# Patient Record
Sex: FEMALE | Race: WHITE | NOT HISPANIC OR LATINO | Employment: FULL TIME | URBAN - METROPOLITAN AREA
[De-identification: names, ages, dates, MRNs, and addresses within clinical notes are randomized per-mention and may not be internally consistent; named-entity substitution may affect disease eponyms.]

---

## 2023-10-09 DIAGNOSIS — F90.0 ATTENTION DEFICIT HYPERACTIVITY DISORDER (ADHD), PREDOMINANTLY INATTENTIVE TYPE: Primary | ICD-10-CM

## 2023-10-09 RX ORDER — DEXTROAMPHETAMINE SACCHARATE, AMPHETAMINE ASPARTATE MONOHYDRATE, DEXTROAMPHETAMINE SULFATE AND AMPHETAMINE SULFATE 7.5; 7.5; 7.5; 7.5 MG/1; MG/1; MG/1; MG/1
30 CAPSULE, EXTENDED RELEASE ORAL EVERY MORNING
COMMUNITY
Start: 2015-02-06 | End: 2023-10-10 | Stop reason: SDUPTHER

## 2023-10-10 RX ORDER — DEXTROAMPHETAMINE SACCHARATE, AMPHETAMINE ASPARTATE MONOHYDRATE, DEXTROAMPHETAMINE SULFATE AND AMPHETAMINE SULFATE 7.5; 7.5; 7.5; 7.5 MG/1; MG/1; MG/1; MG/1
30 CAPSULE, EXTENDED RELEASE ORAL EVERY MORNING
Qty: 30 CAPSULE | Refills: 0 | Status: SHIPPED | OUTPATIENT
Start: 2023-10-10 | End: 2023-11-07 | Stop reason: SDUPTHER

## 2023-10-10 RX ORDER — DEXTROAMPHETAMINE SACCHARATE, AMPHETAMINE ASPARTATE MONOHYDRATE, DEXTROAMPHETAMINE SULFATE AND AMPHETAMINE SULFATE 7.5; 7.5; 7.5; 7.5 MG/1; MG/1; MG/1; MG/1
30 CAPSULE, EXTENDED RELEASE ORAL EVERY MORNING
Qty: 30 CAPSULE | Refills: 0 | Status: CANCELLED | OUTPATIENT
Start: 2023-10-10

## 2023-10-27 ENCOUNTER — LAB (OUTPATIENT)
Dept: LAB | Facility: LAB | Age: 43
End: 2023-10-27
Payer: COMMERCIAL

## 2023-10-27 DIAGNOSIS — Z00.01 ENCOUNTER FOR GENERAL ADULT MEDICAL EXAMINATION WITH ABNORMAL FINDINGS: Primary | ICD-10-CM

## 2023-10-27 LAB
25(OH)D3 SERPL-MCNC: 25 NG/ML (ref 30–100)
ALBUMIN SERPL BCP-MCNC: 4.3 G/DL (ref 3.4–5)
ALP SERPL-CCNC: 64 U/L (ref 33–110)
ALT SERPL W P-5'-P-CCNC: 10 U/L (ref 7–45)
ANION GAP SERPL CALC-SCNC: 8 MMOL/L (ref 10–20)
APPEARANCE UR: ABNORMAL
AST SERPL W P-5'-P-CCNC: 13 U/L (ref 9–39)
BASOPHILS # BLD AUTO: 0.04 X10*3/UL (ref 0–0.1)
BASOPHILS NFR BLD AUTO: 0.4 %
BILIRUB SERPL-MCNC: 0.7 MG/DL (ref 0–1.2)
BILIRUB UR STRIP.AUTO-MCNC: NEGATIVE MG/DL
BUN SERPL-MCNC: 10 MG/DL (ref 6–23)
CALCIUM SERPL-MCNC: 9.5 MG/DL (ref 8.6–10.3)
CHLORIDE SERPL-SCNC: 104 MMOL/L (ref 98–107)
CHOLEST SERPL-MCNC: 164 MG/DL (ref 0–199)
CHOLESTEROL/HDL RATIO: 2.4
CO2 SERPL-SCNC: 29 MMOL/L (ref 21–32)
COLOR UR: YELLOW
CREAT SERPL-MCNC: 0.81 MG/DL (ref 0.5–1.05)
EOSINOPHIL # BLD AUTO: 0 X10*3/UL (ref 0–0.7)
EOSINOPHIL NFR BLD AUTO: 0 %
ERYTHROCYTE [DISTWIDTH] IN BLOOD BY AUTOMATED COUNT: 13 % (ref 11.5–14.5)
GFR SERPL CREATININE-BSD FRML MDRD: >90 ML/MIN/1.73M*2
GLUCOSE SERPL-MCNC: 80 MG/DL (ref 74–99)
GLUCOSE UR STRIP.AUTO-MCNC: NEGATIVE MG/DL
HCT VFR BLD AUTO: 41.1 % (ref 36–46)
HDLC SERPL-MCNC: 67 MG/DL
HGB BLD-MCNC: 13.4 G/DL (ref 12–16)
IMM GRANULOCYTES # BLD AUTO: 0.05 X10*3/UL (ref 0–0.7)
IMM GRANULOCYTES NFR BLD AUTO: 0.5 % (ref 0–0.9)
KETONES UR STRIP.AUTO-MCNC: NEGATIVE MG/DL
LDLC SERPL CALC-MCNC: 82 MG/DL
LEUKOCYTE ESTERASE UR QL STRIP.AUTO: NEGATIVE
LYMPHOCYTES # BLD AUTO: 2.49 X10*3/UL (ref 1.2–4.8)
LYMPHOCYTES NFR BLD AUTO: 24.3 %
MCH RBC QN AUTO: 30 PG (ref 26–34)
MCHC RBC AUTO-ENTMCNC: 32.6 G/DL (ref 32–36)
MCV RBC AUTO: 92 FL (ref 80–100)
MONOCYTES # BLD AUTO: 0.53 X10*3/UL (ref 0.1–1)
MONOCYTES NFR BLD AUTO: 5.2 %
NEUTROPHILS # BLD AUTO: 7.14 X10*3/UL (ref 1.2–7.7)
NEUTROPHILS NFR BLD AUTO: 69.6 %
NITRITE UR QL STRIP.AUTO: NEGATIVE
NON HDL CHOLESTEROL: 97 MG/DL (ref 0–149)
NRBC BLD-RTO: 0 /100 WBCS (ref 0–0)
PH UR STRIP.AUTO: 7 [PH]
PLATELET # BLD AUTO: 275 X10*3/UL (ref 150–450)
PMV BLD AUTO: 10.7 FL (ref 7.5–11.5)
POTASSIUM SERPL-SCNC: 4 MMOL/L (ref 3.5–5.3)
PROT SERPL-MCNC: 6.4 G/DL (ref 6.4–8.2)
PROT UR STRIP.AUTO-MCNC: NEGATIVE MG/DL
RBC # BLD AUTO: 4.46 X10*6/UL (ref 4–5.2)
RBC # UR STRIP.AUTO: NEGATIVE /UL
SODIUM SERPL-SCNC: 137 MMOL/L (ref 136–145)
SP GR UR STRIP.AUTO: 1.02
TRIGL SERPL-MCNC: 77 MG/DL (ref 0–149)
TSH SERPL-ACNC: 1.11 MIU/L (ref 0.44–3.98)
UROBILINOGEN UR STRIP.AUTO-MCNC: <2 MG/DL
VLDL: 15 MG/DL (ref 0–40)
WBC # BLD AUTO: 10.3 X10*3/UL (ref 4.4–11.3)

## 2023-10-27 PROCEDURE — 36415 COLL VENOUS BLD VENIPUNCTURE: CPT

## 2023-10-27 PROCEDURE — 80061 LIPID PANEL: CPT

## 2023-10-27 PROCEDURE — 80053 COMPREHEN METABOLIC PANEL: CPT

## 2023-10-27 PROCEDURE — 81003 URINALYSIS AUTO W/O SCOPE: CPT

## 2023-10-27 PROCEDURE — 84443 ASSAY THYROID STIM HORMONE: CPT

## 2023-10-27 PROCEDURE — 83036 HEMOGLOBIN GLYCOSYLATED A1C: CPT

## 2023-10-27 PROCEDURE — 85025 COMPLETE CBC W/AUTO DIFF WBC: CPT

## 2023-10-27 PROCEDURE — 82306 VITAMIN D 25 HYDROXY: CPT

## 2023-10-28 LAB
EST. AVERAGE GLUCOSE BLD GHB EST-MCNC: 111 MG/DL
HBA1C MFR BLD: 5.5 %

## 2023-10-29 PROBLEM — B96.89 BACTERIAL VAGINOSIS: Status: ACTIVE | Noted: 2023-10-29

## 2023-10-29 PROBLEM — L98.9 SKIN LESION: Status: ACTIVE | Noted: 2023-10-29

## 2023-10-29 PROBLEM — F90.9 ADHD (ATTENTION DEFICIT HYPERACTIVITY DISORDER): Status: ACTIVE | Noted: 2023-10-29

## 2023-10-29 PROBLEM — R92.8 ABNORMAL MAMMOGRAM: Status: ACTIVE | Noted: 2023-10-29

## 2023-10-29 PROBLEM — N76.0 BACTERIAL VAGINOSIS: Status: ACTIVE | Noted: 2023-10-29

## 2023-10-29 PROBLEM — R17 ELEVATED BILIRUBIN: Status: ACTIVE | Noted: 2023-10-29

## 2023-10-29 PROBLEM — N39.0 UTI (URINARY TRACT INFECTION): Status: ACTIVE | Noted: 2023-10-29

## 2023-10-29 RX ORDER — FERROUS FUMARATE 324(106)MG
324 TABLET ORAL DAILY
COMMUNITY
Start: 2021-03-05 | End: 2023-10-31 | Stop reason: ALTCHOICE

## 2023-10-29 RX ORDER — PRENATAL VIT,CAL 76/IRON/FOLIC 29 MG-1 MG
1 TABLET ORAL DAILY
COMMUNITY
End: 2023-10-31 | Stop reason: ALTCHOICE

## 2023-10-29 RX ORDER — ONDANSETRON HYDROCHLORIDE 8 MG/1
8 TABLET, FILM COATED ORAL 3 TIMES DAILY
COMMUNITY
Start: 2021-03-05 | End: 2023-10-31 | Stop reason: ALTCHOICE

## 2023-10-29 RX ORDER — LEVONORGESTREL 52 MG/1
1 INTRAUTERINE DEVICE INTRAUTERINE ONCE
COMMUNITY
End: 2023-10-31 | Stop reason: ALTCHOICE

## 2023-10-29 RX ORDER — BENZONATATE 200 MG/1
200 CAPSULE ORAL EVERY 8 HOURS PRN
COMMUNITY
Start: 2022-12-11 | End: 2023-10-31 | Stop reason: ALTCHOICE

## 2023-10-31 ENCOUNTER — OFFICE VISIT (OUTPATIENT)
Dept: PRIMARY CARE | Facility: CLINIC | Age: 43
End: 2023-10-31
Payer: COMMERCIAL

## 2023-10-31 VITALS
BODY MASS INDEX: 23.95 KG/M2 | TEMPERATURE: 98.3 F | RESPIRATION RATE: 16 BRPM | OXYGEN SATURATION: 99 % | HEIGHT: 68 IN | WEIGHT: 158 LBS | SYSTOLIC BLOOD PRESSURE: 98 MMHG | DIASTOLIC BLOOD PRESSURE: 64 MMHG | HEART RATE: 85 BPM

## 2023-10-31 DIAGNOSIS — F90.0 ADHD (ATTENTION DEFICIT HYPERACTIVITY DISORDER), INATTENTIVE TYPE: Primary | ICD-10-CM

## 2023-10-31 PROCEDURE — 99213 OFFICE O/P EST LOW 20 MIN: CPT | Performed by: INTERNAL MEDICINE

## 2023-10-31 ASSESSMENT — ENCOUNTER SYMPTOMS
DYSURIA: 0
SHORTNESS OF BREATH: 0
NUMBNESS: 0
UNEXPECTED WEIGHT CHANGE: 0
COUGH: 0
PSYCHIATRIC NEGATIVE: 1
CONSTIPATION: 0
FREQUENCY: 0
HEADACHES: 0
PALPITATIONS: 0
BLOOD IN STOOL: 0
DIZZINESS: 0
ABDOMINAL PAIN: 0
ACTIVITY CHANGE: 0
ARTHRALGIAS: 0

## 2023-10-31 NOTE — PROGRESS NOTES
"Subjective   Patient ID: Licha Ulrich is a 43 y.o. female who presents for Follow-up ( Month Check Up ) and Med Refill.    Patient presents with ADHD. (Duration). (Onset). (Course). (Aggravating). (ADHD Symptoms). (ADHD Context). (Family Changes).   She is doing well, feels fine. She takes Adderall daily and seems to help with her job and activities. No problem with focus , concentration , mood swings , attention issues at this time and she eats and sleeps well.She is tolerating Adderall well.No urinary or bowel issues.No fever , cough , dyspnea , headache , dizziness , nausea , palpitations.She exercises regularly and she is back to her workplace.    She is taking care of  small baby     Med Refill  Pertinent negatives include no abdominal pain, arthralgias, chest pain, congestion, coughing, headaches or numbness.        Review of Systems   Constitutional:  Negative for activity change and unexpected weight change.   HENT:  Negative for congestion.    Eyes:  Negative for visual disturbance.        She wears contact lenses   Respiratory:  Negative for cough and shortness of breath.    Cardiovascular:  Negative for chest pain, palpitations and leg swelling.   Gastrointestinal:  Negative for abdominal pain, blood in stool and constipation.   Genitourinary:  Negative for dysuria and frequency.   Musculoskeletal:  Negative for arthralgias.   Neurological:  Negative for dizziness, numbness and headaches.   Psychiatric/Behavioral: Negative.         Objective   BP 98/64 (BP Location: Left arm, Patient Position: Sitting, BP Cuff Size: Adult)   Pulse 85   Temp 36.8 °C (98.3 °F) (Temporal)   Resp 16   Ht 1.727 m (5' 8\")   Wt 71.7 kg (158 lb)   SpO2 99%   BMI 24.02 kg/m²     Physical Exam  Constitutional:       Appearance: Normal appearance.   Cardiovascular:      Rate and Rhythm: Normal rate and regular rhythm.      Pulses: Normal pulses.      Heart sounds: Normal heart sounds. No murmur heard.  Pulmonary:      " Effort: Pulmonary effort is normal.      Breath sounds: Normal breath sounds.   Musculoskeletal:         General: Normal range of motion.      Right lower leg: No edema.      Left lower leg: No edema.   Neurological:      Mental Status: She is alert.   Psychiatric:         Mood and Affect: Mood normal.         Behavior: Behavior normal.         Assessment/Plan        ADHD , inattentive type:   refill Adderrall 30mg # 30   it has helped her to focus and concentrate better   OARRS reviewed     Vision check, she just had  She will get flushot, covid booster at pharmacy  Labs reviewed , 10/27/23

## 2023-11-07 ENCOUNTER — TELEPHONE (OUTPATIENT)
Dept: PRIMARY CARE | Facility: CLINIC | Age: 43
End: 2023-11-07
Payer: COMMERCIAL

## 2023-11-07 DIAGNOSIS — F90.0 ATTENTION DEFICIT HYPERACTIVITY DISORDER (ADHD), PREDOMINANTLY INATTENTIVE TYPE: ICD-10-CM

## 2023-11-07 RX ORDER — DEXTROAMPHETAMINE SACCHARATE, AMPHETAMINE ASPARTATE MONOHYDRATE, DEXTROAMPHETAMINE SULFATE AND AMPHETAMINE SULFATE 7.5; 7.5; 7.5; 7.5 MG/1; MG/1; MG/1; MG/1
30 CAPSULE, EXTENDED RELEASE ORAL EVERY MORNING
Qty: 30 CAPSULE | Refills: 0 | Status: SHIPPED | OUTPATIENT
Start: 2023-11-09 | End: 2023-12-07 | Stop reason: SDUPTHER

## 2023-11-07 NOTE — TELEPHONE ENCOUNTER
Patient is asking for her Adderall 30 mg to be sent into [facility] but patient also wants to know if you can send it in to where she doesn't have to call in every month to us.

## 2023-12-07 DIAGNOSIS — F90.0 ATTENTION DEFICIT HYPERACTIVITY DISORDER (ADHD), PREDOMINANTLY INATTENTIVE TYPE: ICD-10-CM

## 2023-12-07 RX ORDER — DEXTROAMPHETAMINE SACCHARATE, AMPHETAMINE ASPARTATE MONOHYDRATE, DEXTROAMPHETAMINE SULFATE AND AMPHETAMINE SULFATE 7.5; 7.5; 7.5; 7.5 MG/1; MG/1; MG/1; MG/1
30 CAPSULE, EXTENDED RELEASE ORAL EVERY MORNING
Qty: 30 CAPSULE | Refills: 0 | Status: SHIPPED | OUTPATIENT
Start: 2023-12-07 | End: 2024-01-05 | Stop reason: SDUPTHER

## 2023-12-07 NOTE — TELEPHONE ENCOUNTER
ADDERALL  [facility]XR  [facility] [address]    PT OF DR.VORA- KIRIT JIMENEZ SEND TO [facility]

## 2024-01-05 DIAGNOSIS — F90.0 ATTENTION DEFICIT HYPERACTIVITY DISORDER (ADHD), PREDOMINANTLY INATTENTIVE TYPE: ICD-10-CM

## 2024-01-05 RX ORDER — DEXTROAMPHETAMINE SACCHARATE, AMPHETAMINE ASPARTATE MONOHYDRATE, DEXTROAMPHETAMINE SULFATE AND AMPHETAMINE SULFATE 7.5; 7.5; 7.5; 7.5 MG/1; MG/1; MG/1; MG/1
30 CAPSULE, EXTENDED RELEASE ORAL EVERY MORNING
Qty: 30 CAPSULE | Refills: 0 | Status: SHIPPED | OUTPATIENT
Start: 2024-01-05 | End: 2024-01-30 | Stop reason: SDUPTHER

## 2024-01-05 NOTE — TELEPHONE ENCOUNTER
PATIENT LAST SAW DR. VARMA 10/31/23 AND SCHEDULED FOR HER 3 MONTH FOLLOW UP 1/30/24  AND DUE FOR HER LAST 30 DAY SUPPLE OF HER ADDERALL NOW.  ARE YOU ABLE TO REFILL TO HER [facility]? DR VARMA OUT OF THE OFFICE TODAY. THANKS

## 2024-01-30 ENCOUNTER — OFFICE VISIT (OUTPATIENT)
Dept: PRIMARY CARE | Facility: CLINIC | Age: 44
End: 2024-01-30
Payer: COMMERCIAL

## 2024-01-30 VITALS
BODY MASS INDEX: 22.81 KG/M2 | HEART RATE: 82 BPM | OXYGEN SATURATION: 98 % | TEMPERATURE: 98.3 F | WEIGHT: 150 LBS | DIASTOLIC BLOOD PRESSURE: 62 MMHG | SYSTOLIC BLOOD PRESSURE: 89 MMHG | RESPIRATION RATE: 16 BRPM

## 2024-01-30 DIAGNOSIS — F90.0 ATTENTION DEFICIT HYPERACTIVITY DISORDER (ADHD), PREDOMINANTLY INATTENTIVE TYPE: ICD-10-CM

## 2024-01-30 PROCEDURE — 99213 OFFICE O/P EST LOW 20 MIN: CPT | Performed by: INTERNAL MEDICINE

## 2024-01-30 RX ORDER — DEXTROAMPHETAMINE SACCHARATE, AMPHETAMINE ASPARTATE MONOHYDRATE, DEXTROAMPHETAMINE SULFATE AND AMPHETAMINE SULFATE 7.5; 7.5; 7.5; 7.5 MG/1; MG/1; MG/1; MG/1
30 CAPSULE, EXTENDED RELEASE ORAL EVERY MORNING
Qty: 30 CAPSULE | Refills: 0 | Status: SHIPPED | OUTPATIENT
Start: 2024-02-05 | End: 2024-03-04 | Stop reason: SDUPTHER

## 2024-01-30 ASSESSMENT — ENCOUNTER SYMPTOMS
ARTHRALGIAS: 0
NUMBNESS: 0
ACTIVITY CHANGE: 0
CONSTIPATION: 0
DYSURIA: 0
BLOOD IN STOOL: 0
COUGH: 0
DIZZINESS: 0
UNEXPECTED WEIGHT CHANGE: 0
PALPITATIONS: 0
FREQUENCY: 0
ABDOMINAL PAIN: 0
SHORTNESS OF BREATH: 0
HEADACHES: 0
PSYCHIATRIC NEGATIVE: 1

## 2024-01-30 NOTE — PROGRESS NOTES
Subjective   Patient ID: Licha Ulrich is a 44 y.o. female who presents for No chief complaint on file..    Patient presents with ADHD. (Duration). (Onset). (Course). (Aggravating). (ADHD Symptoms). (ADHD Context). (Family Changes).   She is doing well, feels fine. She takes Adderall daily and seems to help with her job and activities. No problem with focus , concentration , mood swings , attention issues at this time and she eats and sleeps well.She is tolerating Adderall well.No urinary or bowel issues.No fever , cough , dyspnea , headache , dizziness , nausea , palpitations.She exercises regularly and she is back to her workplace.    She is taking care of  small baby     Med Refill  Pertinent negatives include no abdominal pain, arthralgias, chest pain, congestion, coughing, headaches or numbness.        Review of Systems   Constitutional:  Negative for activity change and unexpected weight change.   HENT:  Negative for congestion.    Eyes:  Negative for visual disturbance.        She wears contact lenses   Respiratory:  Negative for cough and shortness of breath.    Cardiovascular:  Negative for chest pain, palpitations and leg swelling.   Gastrointestinal:  Negative for abdominal pain, blood in stool and constipation.   Genitourinary:  Negative for dysuria and frequency.   Musculoskeletal:  Negative for arthralgias.   Neurological:  Negative for dizziness, numbness and headaches.   Psychiatric/Behavioral: Negative.         Objective   There were no vitals taken for this visit.    Physical Exam  Constitutional:       Appearance: Normal appearance.   Cardiovascular:      Rate and Rhythm: Normal rate and regular rhythm.      Pulses: Normal pulses.      Heart sounds: Normal heart sounds. No murmur heard.  Pulmonary:      Effort: Pulmonary effort is normal.      Breath sounds: Normal breath sounds.   Musculoskeletal:         General: Normal range of motion.      Right lower leg: No edema.      Left lower leg: No  edema.   Neurological:      Mental Status: She is alert.   Psychiatric:         Mood and Affect: Mood normal.         Behavior: Behavior normal.         Assessment/Plan        ADHD , inattentive type:   refill Adderrall 30mg # 30   it has helped her to focus and concentrate better   OARRS reviewed     Vision check, she just had  She will get flushot, covid booster at pharmacy  Labs reviewed , 10/27/23

## 2024-03-04 DIAGNOSIS — F90.0 ATTENTION DEFICIT HYPERACTIVITY DISORDER (ADHD), PREDOMINANTLY INATTENTIVE TYPE: ICD-10-CM

## 2024-03-04 RX ORDER — DEXTROAMPHETAMINE SACCHARATE, AMPHETAMINE ASPARTATE MONOHYDRATE, DEXTROAMPHETAMINE SULFATE AND AMPHETAMINE SULFATE 7.5; 7.5; 7.5; 7.5 MG/1; MG/1; MG/1; MG/1
30 CAPSULE, EXTENDED RELEASE ORAL EVERY MORNING
Qty: 30 CAPSULE | Refills: 0 | Status: SHIPPED | OUTPATIENT
Start: 2024-03-04 | End: 2024-03-05 | Stop reason: SDUPTHER

## 2024-03-05 DIAGNOSIS — F90.0 ATTENTION DEFICIT HYPERACTIVITY DISORDER (ADHD), PREDOMINANTLY INATTENTIVE TYPE: ICD-10-CM

## 2024-03-05 RX ORDER — DEXTROAMPHETAMINE SACCHARATE, AMPHETAMINE ASPARTATE MONOHYDRATE, DEXTROAMPHETAMINE SULFATE AND AMPHETAMINE SULFATE 7.5; 7.5; 7.5; 7.5 MG/1; MG/1; MG/1; MG/1
30 CAPSULE, EXTENDED RELEASE ORAL EVERY MORNING
Qty: 30 CAPSULE | Refills: 0 | Status: SHIPPED | OUTPATIENT
Start: 2024-03-05 | End: 2024-04-04 | Stop reason: SDUPTHER

## 2024-03-05 RX ORDER — DEXTROAMPHETAMINE SACCHARATE, AMPHETAMINE ASPARTATE MONOHYDRATE, DEXTROAMPHETAMINE SULFATE AND AMPHETAMINE SULFATE 7.5; 7.5; 7.5; 7.5 MG/1; MG/1; MG/1; MG/1
30 CAPSULE, EXTENDED RELEASE ORAL EVERY MORNING
Qty: 30 CAPSULE | Refills: 0 | OUTPATIENT
Start: 2024-03-05

## 2024-04-04 DIAGNOSIS — F90.0 ATTENTION DEFICIT HYPERACTIVITY DISORDER (ADHD), PREDOMINANTLY INATTENTIVE TYPE: ICD-10-CM

## 2024-04-04 RX ORDER — DEXTROAMPHETAMINE SACCHARATE, AMPHETAMINE ASPARTATE MONOHYDRATE, DEXTROAMPHETAMINE SULFATE AND AMPHETAMINE SULFATE 7.5; 7.5; 7.5; 7.5 MG/1; MG/1; MG/1; MG/1
30 CAPSULE, EXTENDED RELEASE ORAL EVERY MORNING
Qty: 30 CAPSULE | Refills: 0 | Status: SHIPPED | OUTPATIENT
Start: 2024-04-04 | End: 2024-04-30 | Stop reason: SDUPTHER

## 2024-04-30 ENCOUNTER — OFFICE VISIT (OUTPATIENT)
Dept: PRIMARY CARE | Facility: CLINIC | Age: 44
End: 2024-04-30
Payer: COMMERCIAL

## 2024-04-30 VITALS
SYSTOLIC BLOOD PRESSURE: 116 MMHG | BODY MASS INDEX: 24.22 KG/M2 | WEIGHT: 159.8 LBS | DIASTOLIC BLOOD PRESSURE: 58 MMHG | OXYGEN SATURATION: 100 % | TEMPERATURE: 97.5 F | HEART RATE: 78 BPM | HEIGHT: 68 IN

## 2024-04-30 DIAGNOSIS — F90.0 ATTENTION DEFICIT HYPERACTIVITY DISORDER (ADHD), PREDOMINANTLY INATTENTIVE TYPE: ICD-10-CM

## 2024-04-30 PROCEDURE — 99213 OFFICE O/P EST LOW 20 MIN: CPT | Performed by: INTERNAL MEDICINE

## 2024-04-30 PROCEDURE — 1036F TOBACCO NON-USER: CPT | Performed by: INTERNAL MEDICINE

## 2024-04-30 RX ORDER — DEXTROAMPHETAMINE SACCHARATE, AMPHETAMINE ASPARTATE MONOHYDRATE, DEXTROAMPHETAMINE SULFATE AND AMPHETAMINE SULFATE 7.5; 7.5; 7.5; 7.5 MG/1; MG/1; MG/1; MG/1
30 CAPSULE, EXTENDED RELEASE ORAL EVERY MORNING
Qty: 30 CAPSULE | Refills: 0 | Status: SHIPPED | OUTPATIENT
Start: 2024-05-03 | End: 2024-06-04 | Stop reason: SDUPTHER

## 2024-04-30 ASSESSMENT — ENCOUNTER SYMPTOMS
CONSTIPATION: 0
ACTIVITY CHANGE: 0
PALPITATIONS: 0
DYSURIA: 0
BLOOD IN STOOL: 0
PSYCHIATRIC NEGATIVE: 1
DIZZINESS: 0
UNEXPECTED WEIGHT CHANGE: 0
FREQUENCY: 0
SHORTNESS OF BREATH: 0

## 2024-04-30 NOTE — PROGRESS NOTES
"Subjective   Patient ID: Licha Ulrich is a 44 y.o. female who presents for Med Refill.    Patient presents with ADHD. (Duration). (Onset). (Course). (Aggravating). (ADHD Symptoms). (ADHD Context). (Family Changes).   She is doing well, feels fine. She takes Adderall daily and seems to help with her job and activities. No problem with focus , concentration , mood swings , attention issues at this time and she eats and sleeps well.She is tolerating Adderall well.No urinary or bowel issues.No fever , cough , dyspnea , headache , dizziness , nausea , palpitations.She exercises regularly and she is back to her workplace.    She is taking care of  a three year old child which becomes stressful sometimes.         Review of Systems   Constitutional:  Negative for activity change and unexpected weight change.   Eyes:  Negative for visual disturbance.        She wears contact lenses   Respiratory:  Negative for shortness of breath.    Cardiovascular:  Negative for palpitations and leg swelling.   Gastrointestinal:  Negative for blood in stool and constipation.   Genitourinary:  Negative for dysuria and frequency.   Neurological:  Negative for dizziness.   Psychiatric/Behavioral: Negative.         Objective   /58 (BP Location: Left arm, Patient Position: Sitting, BP Cuff Size: Adult)   Pulse 78   Temp 36.4 °C (97.5 °F) (Temporal)   Ht 1.727 m (5' 8\")   Wt 72.5 kg (159 lb 12.8 oz)   SpO2 100%   BMI 24.30 kg/m²     Physical Exam  Constitutional:       Appearance: Normal appearance.   Cardiovascular:      Rate and Rhythm: Normal rate and regular rhythm.      Pulses: Normal pulses.      Heart sounds: Normal heart sounds. No murmur heard.  Pulmonary:      Effort: Pulmonary effort is normal.      Breath sounds: Normal breath sounds.   Musculoskeletal:         General: Normal range of motion.      Right lower leg: No edema.      Left lower leg: No edema.   Neurological:      Mental Status: She is alert.   Psychiatric:    "      Mood and Affect: Mood normal.         Behavior: Behavior normal.         Assessment/Plan        ADHD , inattentive type:   refill Adderrall 30mg # 30   it has helped her to focus and concentrate better   OARRS reviewed     Vision check, she just had  She will get flushot, covid booster at pharmacy

## 2024-06-04 DIAGNOSIS — F90.0 ATTENTION DEFICIT HYPERACTIVITY DISORDER (ADHD), PREDOMINANTLY INATTENTIVE TYPE: ICD-10-CM

## 2024-06-04 RX ORDER — DEXTROAMPHETAMINE SACCHARATE, AMPHETAMINE ASPARTATE MONOHYDRATE, DEXTROAMPHETAMINE SULFATE AND AMPHETAMINE SULFATE 7.5; 7.5; 7.5; 7.5 MG/1; MG/1; MG/1; MG/1
30 CAPSULE, EXTENDED RELEASE ORAL EVERY MORNING
Qty: 30 CAPSULE | Refills: 0 | Status: SHIPPED | OUTPATIENT
Start: 2024-06-04

## 2024-07-02 DIAGNOSIS — F90.0 ATTENTION DEFICIT HYPERACTIVITY DISORDER (ADHD), PREDOMINANTLY INATTENTIVE TYPE: ICD-10-CM

## 2024-07-02 RX ORDER — DEXTROAMPHETAMINE SACCHARATE, AMPHETAMINE ASPARTATE MONOHYDRATE, DEXTROAMPHETAMINE SULFATE AND AMPHETAMINE SULFATE 7.5; 7.5; 7.5; 7.5 MG/1; MG/1; MG/1; MG/1
30 CAPSULE, EXTENDED RELEASE ORAL EVERY MORNING
Qty: 30 CAPSULE | Refills: 0 | Status: SHIPPED | OUTPATIENT
Start: 2024-07-03

## 2024-08-01 ENCOUNTER — APPOINTMENT (OUTPATIENT)
Dept: PRIMARY CARE | Facility: CLINIC | Age: 44
End: 2024-08-01
Payer: COMMERCIAL

## 2024-08-01 VITALS
HEIGHT: 68 IN | OXYGEN SATURATION: 98 % | WEIGHT: 159 LBS | DIASTOLIC BLOOD PRESSURE: 70 MMHG | HEART RATE: 72 BPM | SYSTOLIC BLOOD PRESSURE: 126 MMHG | BODY MASS INDEX: 24.1 KG/M2

## 2024-08-01 DIAGNOSIS — F90.0 ATTENTION DEFICIT HYPERACTIVITY DISORDER (ADHD), PREDOMINANTLY INATTENTIVE TYPE: ICD-10-CM

## 2024-08-01 PROCEDURE — 99214 OFFICE O/P EST MOD 30 MIN: CPT | Performed by: INTERNAL MEDICINE

## 2024-08-01 PROCEDURE — 1036F TOBACCO NON-USER: CPT | Performed by: INTERNAL MEDICINE

## 2024-08-01 PROCEDURE — 3008F BODY MASS INDEX DOCD: CPT | Performed by: INTERNAL MEDICINE

## 2024-08-01 RX ORDER — DEXTROAMPHETAMINE SACCHARATE, AMPHETAMINE ASPARTATE MONOHYDRATE, DEXTROAMPHETAMINE SULFATE AND AMPHETAMINE SULFATE 7.5; 7.5; 7.5; 7.5 MG/1; MG/1; MG/1; MG/1
30 CAPSULE, EXTENDED RELEASE ORAL EVERY MORNING
Qty: 30 CAPSULE | Refills: 0 | Status: SHIPPED | OUTPATIENT
Start: 2024-08-01

## 2024-08-01 ASSESSMENT — ENCOUNTER SYMPTOMS
UNEXPECTED WEIGHT CHANGE: 0
SHORTNESS OF BREATH: 0
DYSURIA: 0
CONSTIPATION: 0
PSYCHIATRIC NEGATIVE: 1
BLOOD IN STOOL: 0
DIZZINESS: 0
PALPITATIONS: 0
ACTIVITY CHANGE: 0
FREQUENCY: 0

## 2024-08-01 NOTE — PROGRESS NOTES
"Subjective   Patient ID: Licha Ulrich is a 44 y.o. female who presents for Med Refill.    Patient presents with ADHD.   She is doing well, feels fine. She takes Adderall daily and seems to help with her job and activities. No problem with focus , concentration , mood swings , attention issues at this time and she eats and sleeps well.She is tolerating Adderall well.No urinary or bowel issues.No fever , cough , dyspnea , headache , dizziness , nausea , palpitations.She exercises regularly and she is back to her workplace.    She is taking care of  a three year old child which becomes stressful sometimes. She does vaping.         Review of Systems   Constitutional:  Negative for activity change and unexpected weight change.   Eyes:  Negative for visual disturbance.        She wears contact lenses   Respiratory:  Negative for shortness of breath.    Cardiovascular:  Negative for palpitations and leg swelling.   Gastrointestinal:  Negative for blood in stool and constipation.   Genitourinary:  Negative for dysuria and frequency.   Neurological:  Negative for dizziness.   Psychiatric/Behavioral: Negative.         Objective   /70   Pulse 72   Ht 1.727 m (5' 8\")   Wt 72.1 kg (159 lb)   SpO2 98%   BMI 24.18 kg/m²     Physical Exam  Constitutional:       Appearance: Normal appearance.   Cardiovascular:      Rate and Rhythm: Normal rate and regular rhythm.      Pulses: Normal pulses.      Heart sounds: Normal heart sounds. No murmur heard.  Pulmonary:      Effort: Pulmonary effort is normal.      Breath sounds: Normal breath sounds.   Musculoskeletal:         General: Normal range of motion.      Right lower leg: No edema.      Left lower leg: No edema.   Neurological:      Mental Status: She is alert.   Psychiatric:         Mood and Affect: Mood normal.         Behavior: Behavior normal.         Assessment/Plan        ADHD , inattentive type:   refill Adderrall 30mg # 30   it has helped her to focus and " concentrate better   OARRS reviewed   : I have personally reviewed the Oarrs  report on this patient.   I have considered the risks of abuse dependence addiction and diversion.             I believe it is clinically appropriate for this patient to be prescribed the medications      Vision check, she just had  She defers for  flushot, and covid booster   Gyn check , and mammogram per Dr Michael Hernandez  Labs next visit

## 2024-09-03 ENCOUNTER — TELEPHONE (OUTPATIENT)
Dept: PRIMARY CARE | Facility: CLINIC | Age: 44
End: 2024-09-03
Payer: COMMERCIAL

## 2024-09-03 DIAGNOSIS — F90.0 ATTENTION DEFICIT HYPERACTIVITY DISORDER (ADHD), PREDOMINANTLY INATTENTIVE TYPE: ICD-10-CM

## 2024-09-03 RX ORDER — DEXTROAMPHETAMINE SACCHARATE, AMPHETAMINE ASPARTATE MONOHYDRATE, DEXTROAMPHETAMINE SULFATE AND AMPHETAMINE SULFATE 7.5; 7.5; 7.5; 7.5 MG/1; MG/1; MG/1; MG/1
30 CAPSULE, EXTENDED RELEASE ORAL EVERY MORNING
Qty: 30 CAPSULE | Refills: 0 | Status: SHIPPED | OUTPATIENT
Start: 2024-09-03 | End: 2024-09-03 | Stop reason: SDUPTHER

## 2024-09-03 RX ORDER — DEXTROAMPHETAMINE SACCHARATE, AMPHETAMINE ASPARTATE MONOHYDRATE, DEXTROAMPHETAMINE SULFATE AND AMPHETAMINE SULFATE 7.5; 7.5; 7.5; 7.5 MG/1; MG/1; MG/1; MG/1
30 CAPSULE, EXTENDED RELEASE ORAL EVERY MORNING
Qty: 30 CAPSULE | Refills: 0 | Status: SHIPPED | OUTPATIENT
Start: 2024-09-03

## 2024-09-03 NOTE — TELEPHONE ENCOUNTER
Patient called and says [facility] doesn't have the adderall in stock BUT [facility] [place] does.  Can you resent to [facility] in chart?

## 2024-10-01 DIAGNOSIS — F90.0 ATTENTION DEFICIT HYPERACTIVITY DISORDER (ADHD), PREDOMINANTLY INATTENTIVE TYPE: ICD-10-CM

## 2024-10-01 RX ORDER — DEXTROAMPHETAMINE SACCHARATE, AMPHETAMINE ASPARTATE MONOHYDRATE, DEXTROAMPHETAMINE SULFATE AND AMPHETAMINE SULFATE 7.5; 7.5; 7.5; 7.5 MG/1; MG/1; MG/1; MG/1
30 CAPSULE, EXTENDED RELEASE ORAL EVERY MORNING
Qty: 30 CAPSULE | Refills: 0 | Status: SHIPPED | OUTPATIENT
Start: 2024-10-02

## 2024-10-10 ENCOUNTER — APPOINTMENT (OUTPATIENT)
Dept: PRIMARY CARE | Facility: CLINIC | Age: 44
End: 2024-10-10
Payer: COMMERCIAL

## 2024-10-30 DIAGNOSIS — Z00.00 WELLNESS EXAMINATION: ICD-10-CM

## 2024-10-30 DIAGNOSIS — F90.0 ATTENTION DEFICIT HYPERACTIVITY DISORDER (ADHD), PREDOMINANTLY INATTENTIVE TYPE: Primary | ICD-10-CM

## 2024-10-31 ENCOUNTER — APPOINTMENT (OUTPATIENT)
Dept: PRIMARY CARE | Facility: CLINIC | Age: 44
End: 2024-10-31
Payer: COMMERCIAL

## 2024-10-31 VITALS
BODY MASS INDEX: 25.09 KG/M2 | WEIGHT: 165 LBS | SYSTOLIC BLOOD PRESSURE: 124 MMHG | DIASTOLIC BLOOD PRESSURE: 81 MMHG | HEART RATE: 79 BPM | RESPIRATION RATE: 16 BRPM | TEMPERATURE: 97.2 F

## 2024-10-31 DIAGNOSIS — K64.9 HEMORRHOIDS, UNSPECIFIED HEMORRHOID TYPE: Primary | ICD-10-CM

## 2024-10-31 DIAGNOSIS — F90.0 ATTENTION DEFICIT HYPERACTIVITY DISORDER (ADHD), PREDOMINANTLY INATTENTIVE TYPE: ICD-10-CM

## 2024-10-31 PROCEDURE — 99213 OFFICE O/P EST LOW 20 MIN: CPT | Performed by: INTERNAL MEDICINE

## 2024-10-31 PROCEDURE — 99396 PREV VISIT EST AGE 40-64: CPT | Performed by: INTERNAL MEDICINE

## 2024-10-31 RX ORDER — DEXTROAMPHETAMINE SACCHARATE, AMPHETAMINE ASPARTATE MONOHYDRATE, DEXTROAMPHETAMINE SULFATE AND AMPHETAMINE SULFATE 7.5; 7.5; 7.5; 7.5 MG/1; MG/1; MG/1; MG/1
30 CAPSULE, EXTENDED RELEASE ORAL EVERY MORNING
Qty: 30 CAPSULE | Refills: 0 | Status: SHIPPED | OUTPATIENT
Start: 2024-11-02

## 2024-10-31 RX ORDER — HYDROCORTISONE ACETATE 25 MG/1
25 SUPPOSITORY RECTAL 2 TIMES DAILY PRN
Qty: 30 SUPPOSITORY | Refills: 1 | Status: SHIPPED | OUTPATIENT
Start: 2024-10-31

## 2024-10-31 ASSESSMENT — ENCOUNTER SYMPTOMS
EYE REDNESS: 0
ARTHRALGIAS: 0
NUMBNESS: 0
FREQUENCY: 0
ABDOMINAL PAIN: 0
ENDOCRINE NEGATIVE: 1
ADENOPATHY: 0
FEVER: 0
BLOOD IN STOOL: 0
FATIGUE: 0
WHEEZING: 0
HEADACHES: 0
UNEXPECTED WEIGHT CHANGE: 0
AGITATION: 0
DYSURIA: 0
PALPITATIONS: 0
JOINT SWELLING: 0
CONSTIPATION: 0
ACTIVITY CHANGE: 0
PSYCHIATRIC NEGATIVE: 1
SHORTNESS OF BREATH: 0
BACK PAIN: 0
WEAKNESS: 0
DIZZINESS: 0
COUGH: 0
ALLERGIC/IMMUNOLOGIC NEGATIVE: 1

## 2024-11-20 ENCOUNTER — APPOINTMENT (OUTPATIENT)
Dept: SURGERY | Facility: CLINIC | Age: 44
End: 2024-11-20
Payer: COMMERCIAL

## 2024-12-03 DIAGNOSIS — F90.0 ATTENTION DEFICIT HYPERACTIVITY DISORDER (ADHD), PREDOMINANTLY INATTENTIVE TYPE: ICD-10-CM

## 2024-12-03 RX ORDER — DEXTROAMPHETAMINE SACCHARATE, AMPHETAMINE ASPARTATE MONOHYDRATE, DEXTROAMPHETAMINE SULFATE AND AMPHETAMINE SULFATE 7.5; 7.5; 7.5; 7.5 MG/1; MG/1; MG/1; MG/1
30 CAPSULE, EXTENDED RELEASE ORAL EVERY MORNING
Qty: 30 CAPSULE | Refills: 0 | Status: SHIPPED | OUTPATIENT
Start: 2024-12-03

## 2024-12-11 ENCOUNTER — APPOINTMENT (OUTPATIENT)
Dept: SURGERY | Facility: CLINIC | Age: 44
End: 2024-12-11
Payer: COMMERCIAL

## 2024-12-17 ENCOUNTER — APPOINTMENT (OUTPATIENT)
Facility: CLINIC | Age: 44
End: 2024-12-17
Payer: COMMERCIAL

## 2024-12-17 VITALS
OXYGEN SATURATION: 99 % | HEART RATE: 81 BPM | HEIGHT: 68 IN | SYSTOLIC BLOOD PRESSURE: 98 MMHG | BODY MASS INDEX: 25.55 KG/M2 | WEIGHT: 168.6 LBS | DIASTOLIC BLOOD PRESSURE: 68 MMHG

## 2024-12-17 DIAGNOSIS — K62.89 ANAL PAIN: ICD-10-CM

## 2024-12-17 DIAGNOSIS — K64.4 ANAL SKIN TAG: Primary | ICD-10-CM

## 2024-12-17 DIAGNOSIS — K60.0 ACUTE ANAL FISSURE: ICD-10-CM

## 2024-12-17 DIAGNOSIS — K64.8 INTERNAL HEMORRHOID, BLEEDING: ICD-10-CM

## 2024-12-17 PROCEDURE — 99203 OFFICE O/P NEW LOW 30 MIN: CPT | Performed by: SURGERY

## 2024-12-17 PROCEDURE — 1036F TOBACCO NON-USER: CPT | Performed by: SURGERY

## 2024-12-17 PROCEDURE — 3008F BODY MASS INDEX DOCD: CPT | Performed by: SURGERY

## 2024-12-17 ASSESSMENT — ENCOUNTER SYMPTOMS
ANAL BLEEDING: 1
MYALGIAS: 0
CHILLS: 0
NAUSEA: 0
EYE REDNESS: 0
CONFUSION: 0
WEAKNESS: 0
EYE PAIN: 0
FEVER: 0
FATIGUE: 0
DIARRHEA: 0
VOMITING: 0
DYSURIA: 0
FREQUENCY: 0
SHORTNESS OF BREATH: 0
BRUISES/BLEEDS EASILY: 0
CONSTIPATION: 0
WOUND: 0
ARTHRALGIAS: 0
HEMATURIA: 0
RECTAL PAIN: 1
HEADACHES: 0
FLANK PAIN: 0
COUGH: 0
SPEECH DIFFICULTY: 0
ABDOMINAL PAIN: 0
POLYPHAGIA: 0
AGITATION: 0

## 2024-12-17 NOTE — PATIENT INSTRUCTIONS
1.  Your anal pain and bleeding most likely is from an anal fissure.  Do NOT use any suppositories.  Instead, a nitroglycerin ointment has been prescribed for you.  This must be filled at one of the compounding pharmacies.  Please see the list provided from the office this.  Will place just a small amount of the nitroglycerin ointment on the outside anal region 3-4 times per day.  This will help with pain and help relax the spasm.  2.  You will also need to be on a high-fiber diet.  Your goal is 25 g of fiber per day.  Please see the medical management of hemorrhoids/anal fissures sheet provided from the office.  Anticipate that your anal pain may increase once you start on a high-fiber diet.  However, continue on the high-fiber diet.  3.  You may use Tylenol or ibuprofen to help with your pain.  You may also use a ice pack or heating pad.  4.  Return to Dr. Mckeon's office in 4 weeks to check on your improvement with your medication.

## 2024-12-17 NOTE — PROGRESS NOTES
"    GENERAL SURGERY OFFICE NOTE    Patient: Licha Ulrich    Age: 44 y.o.   Gender: female    MRN: [id number]    PCP: Digna Sutton MD        SUBJECTIVE     Chief Complaint  New Patient Visit (Patient was referred by Dr. Sutton for external hemorrhoids. Patient states that she gave birth 3 years ago and got the hemorrhoid during child birth. Patient states that she has bleeding with bowel movements. Patient states that she has regular bowel movements. Patient states that she was very constipated during her pregnancy. )       ANDREINA Hurt is a 44-year-old white female who I am seeing in consultation at the request of her primary care physician for evaluation of \"external hemorrhoids\".  Patient states that about 3 years ago during her pregnancy, she got quite constipated.  She also notes that she had a very long labor.  She had developed some \"hemorrhoids\" following delivery.  She has not had any bleeding or problems with her hemorrhoids until about 6 months ago.  She states that there is a lump on the anal region which makes it difficult to keep herself clean; therefore, she does aggressive cleaning and wiping after a bowel movement.  Her biggest concern is that of pain and blood with bowel movements.  She feels like she is passing broken glass when she has a bowel movement.  She will have bright red blood after a bowel movement as well.  She has not noticed any blood between bowel movements.  She usually has a bowel movement once a day.  Denies any straining with her bowel movements.  Although her primary care physician gave her steroid suppositories, she has not picked up this prescription yet.  She has not tried any over-the-counter medications.  She has not had a colonoscopy    ROS  Review of Systems   Constitutional:  Negative for chills, fatigue and fever.   HENT:  Negative for congestion, ear pain and hearing loss.    Eyes:  Negative for pain and redness.   Respiratory:  Negative for cough and shortness of breath.  "   Cardiovascular:  Negative for chest pain and leg swelling.   Gastrointestinal:  Positive for anal bleeding and rectal pain. Negative for abdominal pain, constipation, diarrhea, nausea and vomiting.   Endocrine: Negative for polyphagia.   Genitourinary:  Negative for dysuria, flank pain, frequency and hematuria.   Musculoskeletal:  Negative for arthralgias and myalgias.   Skin:  Negative for rash and wound.   Allergic/Immunologic: Negative for immunocompromised state.   Neurological:  Negative for speech difficulty, weakness and headaches.   Hematological:  Does not bruise/bleed easily.   Psychiatric/Behavioral:  Negative for agitation and confusion.           HISTORY     Past Medical History:   Diagnosis Date    Abnormal cytological findings in specimens from other organs, systems and tissues 09/18/2014    LGSIL (low grade squamous intraepithelial lesion) on Pap smear    Fissure, anal 2021    Personal history of other mental and behavioral disorders     History of attention deficit disorder    Personal history of other mental and behavioral disorders 04/28/2014    History of attention deficit hyperactivity disorder    Rectal bleeding 2021        Past Surgical History:   Procedure Laterality Date    OTHER SURGICAL HISTORY  04/10/2014    Wrist Surgery Left        Family History   Problem Relation Name Age of Onset    Thyroid disease Mother      Hodgkin's lymphoma Father Jason Buenose     Cancer Father Jason Serg     Diabetes type II Brother      Diabetes Brother Jason Buenose II     Diabetes Brother Jason Buenose II         No Known Allergies     Social History     Tobacco Use   Smoking Status Never   Smokeless Tobacco Never        Social History     Substance and Sexual Activity   Alcohol Use Yes    Alcohol/week: 2.0 standard drinks of alcohol    Types: 2 Glasses of wine per week        HOME MEDICATIONS  Current Outpatient Medications   Medication Instructions    amphetamine-dextroamphetamine XR (Adderall XR) 30  "mg 24 hr capsule 30 mg, oral, Every morning    hydrocortisone (ANUSOL-HC) 25 mg, rectal, 2 times daily PRN          OBJECTIVE   Last Recorded Vitals.  Blood pressure 98/68, pulse 81, height 1.727 m (5' 8\"), weight 76.5 kg (168 lb 9.6 oz), SpO2 99%.     PHYSICAL EXAM  Physical Exam   General: Well-developed, well-nourished and in no acute distress.  Head: Normocephalic. Atraumatic.  Neck/thyroid: Neck is supple.   Eyes: Pupils equal round and reactive to light. Conjunctiva normal.  ENMT: No masses or deformity of external nose. External ears without masses.  Respiratory/Chest:  Normal respiratory effort.  Cardiovascular: Regular rate and rhythm.   Abdomen: Soft, nontender, nondistended.  Rectal: No evidence of a pilonidal cyst.  On inspection, she has 3 anal skin tags with 1 more prominent anal skin tag anteriorly.  All the skin tags are soft, nonindurated and nontender.  Digital examination was difficult due to the patient's anal spasm.  However, there was some mild induration along the anterior anal opening.  Grade 1 internal hemorrhoids without any active bleeding.  Musculoskeletal: Joints and limbs are grossly normal. Normal gait. Normal range of motion of major joints.  Neuro: Oriented to person, place and time. No obvious neurological deficit. Motor strength grossly normal.  Psych: Normal mood and affect.    RESULTS   LABS      RADIOLOGY RESULTS      PATHOLOGY      The above labs, radiology films and pathology reports were reviewed by myself.   Referring physician notes and other providers notes reviewed.      ASSESSMENT / PLAN   Diagnoses and all orders for this visit:  Anal skin tag  Anal pain  -     Referral to General Surgery  Acute anal fissure  -     nitroglycerin (Ac-Bid) 2 % ointment; Place 0.5 inches on the skin 3 times a day. Apply to outside anal area 3 times per day.  Internal hemorrhoid, bleeding      Plan  1.  Suspect that her pain and rectal bleeding is likely due from an anterior anal fissure. " The pathophysiology of an anal fissure was reviewed with the patient.  A posterior anal fissure is the most common location for an anal fissure.  We discussed that medical management would include a compounded nitroglycerin ointment (to improve blood flow to the anal area and reduce anal sphincter spasm) as well as fiber supplementation to bulk up the stool.  This medical management can be used for up to 8 weeks.  Will recheck the patient in 4 weeks for signs of improvement.  If no improvement, may consider going to surgery for an anal sphincterotomy.  If there is improvement in 4 weeks, will allow for medical management to continue for a total of 8 weeks.  2.  High fiber diet: The patient was instructed to gradually increase dietary fiber intake to a goal of 25 g per day for women and 30 g of fiber per day for men.  This will initially increase pain with bowel movements for at least the first week.  The fiber supplementation should continue to be taken for up to 8 weeks.  3.  We discussed that constipation and long periods of time sitting on the toilet can cause recurrent fissures.  4.  We discussed that a good anal exam is difficult due to the associated pain with anal fissures.  Therefore, if the pain is not completely resolved at 8 weeks, will need to undergo an exam under anesthesia to rule out other underlying etiologies for the anal pain.  5.  She does have some moderate size anal skin tags.  This may make it more difficult for her to keep herself clean, but the anal skin tags are not the etiology of her pain or bleeding.  However, if she does require surgical intervention for her anal fissure/anal pain, can consider removal of the larger of the anal skin tags at that same time.  6.  Her anal exam was difficult due to her anal spasm, but there did appear to be grade 1 internal hemorrhoids as well.  Some of her bleeding could be from these hemorrhoids.  However, would not recommend use of the steroid  suppositories given the high suspicion for an anal fissure.          Veronique Mckeon MD, FACS  [facility] General Surgery  [address];   [address]  [place]  [address] [phone]

## 2024-12-17 NOTE — LETTER
"December 17, 2024     Digna Sutton MD  [address] [facility] [address]  [place] [address]    Patient: Licha Ulrich   YOB: 1980   Date of Visit: 12/17/2024       Dear Dr. Digna Sutton MD:    Thank you for referring Licha Ulrich to me for evaluation. Below are my notes for this consultation.  If you have questions, please do not hesitate to call me. I look forward to following your patient along with you.       Sincerely,     Veronique Mckeon MD      CC: No Recipients  ______________________________________________________________________________________        GENERAL SURGERY OFFICE NOTE    Patient: Licha Ulrich    Age: 44 y.o.   Gender: female    MRN: [id number]    PCP: Digna Sutton MD        SUBJECTIVE     Chief Complaint  New Patient Visit (Patient was referred by Dr. Sutton for external hemorrhoids. Patient states that she gave birth 3 years ago and got the hemorrhoid during child birth. Patient states that she has bleeding with bowel movements. Patient states that she has regular bowel movements. Patient states that she was very constipated during her pregnancy. )       ANDREINA Hurt is a 44-year-old white female who I am seeing in consultation at the request of her primary care physician for evaluation of \"external hemorrhoids\".  Patient states that about 3 years ago during her pregnancy, she got quite constipated.  She also notes that she had a very long labor.  She had developed some \"hemorrhoids\" following delivery.  She has not had any bleeding or problems with her hemorrhoids until about 6 months ago.  She states that there is a lump on the anal region which makes it difficult to keep herself clean; therefore, she does aggressive cleaning and wiping after a bowel movement.  Her biggest concern is that of pain and blood with bowel movements.  She feels like she is passing broken glass when she has a bowel movement.  She will have bright red blood after a bowel movement as well.  She has not " noticed any blood between bowel movements.  She usually has a bowel movement once a day.  Denies any straining with her bowel movements.  Although her primary care physician gave her steroid suppositories, she has not picked up this prescription yet.  She has not tried any over-the-counter medications.  She has not had a colonoscopy    ROS  Review of Systems   Constitutional:  Negative for chills, fatigue and fever.   HENT:  Negative for congestion, ear pain and hearing loss.    Eyes:  Negative for pain and redness.   Respiratory:  Negative for cough and shortness of breath.    Cardiovascular:  Negative for chest pain and leg swelling.   Gastrointestinal:  Positive for anal bleeding and rectal pain. Negative for abdominal pain, constipation, diarrhea, nausea and vomiting.   Endocrine: Negative for polyphagia.   Genitourinary:  Negative for dysuria, flank pain, frequency and hematuria.   Musculoskeletal:  Negative for arthralgias and myalgias.   Skin:  Negative for rash and wound.   Allergic/Immunologic: Negative for immunocompromised state.   Neurological:  Negative for speech difficulty, weakness and headaches.   Hematological:  Does not bruise/bleed easily.   Psychiatric/Behavioral:  Negative for agitation and confusion.           HISTORY     Past Medical History:   Diagnosis Date   • Abnormal cytological findings in specimens from other organs, systems and tissues 09/18/2014    LGSIL (low grade squamous intraepithelial lesion) on Pap smear   • Fissure, anal 2021   • Personal history of other mental and behavioral disorders     History of attention deficit disorder   • Personal history of other mental and behavioral disorders 04/28/2014    History of attention deficit hyperactivity disorder   • Rectal bleeding 2021        Past Surgical History:   Procedure Laterality Date   • OTHER SURGICAL HISTORY  04/10/2014    Wrist Surgery Left        Family History   Problem Relation Name Age of Onset   • Thyroid disease  "Mother     • Hodgkin's lymphoma Father Jason Ulrich    • Cancer Father Jason Ulrich    • Diabetes type II Brother     • Diabetes Brother Jason Ulrich II    • Diabetes Brother Jason Ulrich II         No Known Allergies     Social History     Tobacco Use   Smoking Status Never   Smokeless Tobacco Never        Social History     Substance and Sexual Activity   Alcohol Use Yes   • Alcohol/week: 2.0 standard drinks of alcohol   • Types: 2 Glasses of wine per week        HOME MEDICATIONS  Current Outpatient Medications   Medication Instructions   • amphetamine-dextroamphetamine XR (Adderall XR) 30 mg 24 hr capsule 30 mg, oral, Every morning   • hydrocortisone (ANUSOL-HC) 25 mg, rectal, 2 times daily PRN          OBJECTIVE   Last Recorded Vitals.  Blood pressure 98/68, pulse 81, height 1.727 m (5' 8\"), weight 76.5 kg (168 lb 9.6 oz), SpO2 99%.     PHYSICAL EXAM  Physical Exam   General: Well-developed, well-nourished and in no acute distress.  Head: Normocephalic. Atraumatic.  Neck/thyroid: Neck is supple.   Eyes: Pupils equal round and reactive to light. Conjunctiva normal.  ENMT: No masses or deformity of external nose. External ears without masses.  Respiratory/Chest:  Normal respiratory effort.  Cardiovascular: Regular rate and rhythm.   Abdomen: Soft, nontender, nondistended.  Rectal: No evidence of a pilonidal cyst.  On inspection, she has 3 anal skin tags with 1 more prominent anal skin tag anteriorly.  All the skin tags are soft, nonindurated and nontender.  Digital examination was difficult due to the patient's anal spasm.  However, there was some mild induration along the anterior anal opening.  Grade 1 internal hemorrhoids without any active bleeding.  Musculoskeletal: Joints and limbs are grossly normal. Normal gait. Normal range of motion of major joints.  Neuro: Oriented to person, place and time. No obvious neurological deficit. Motor strength grossly normal.  Psych: Normal mood and affect.    RESULTS "   LABS      RADIOLOGY RESULTS      PATHOLOGY      The above labs, radiology films and pathology reports were reviewed by myself.   Referring physician notes and other providers notes reviewed.      ASSESSMENT / PLAN   Diagnoses and all orders for this visit:  Anal skin tag  Anal pain  -     Referral to General Surgery  Acute anal fissure  -     nitroglycerin (Ac-Bid) 2 % ointment; Place 0.5 inches on the skin 3 times a day. Apply to outside anal area 3 times per day.  Internal hemorrhoid, bleeding      Plan  1.  Suspect that her pain and rectal bleeding is likely due from an anterior anal fissure. The pathophysiology of an anal fissure was reviewed with the patient.  A posterior anal fissure is the most common location for an anal fissure.  We discussed that medical management would include a compounded nitroglycerin ointment (to improve blood flow to the anal area and reduce anal sphincter spasm) as well as fiber supplementation to bulk up the stool.  This medical management can be used for up to 8 weeks.  Will recheck the patient in 4 weeks for signs of improvement.  If no improvement, may consider going to surgery for an anal sphincterotomy.  If there is improvement in 4 weeks, will allow for medical management to continue for a total of 8 weeks.  2.  High fiber diet: The patient was instructed to gradually increase dietary fiber intake to a goal of 25 g per day for women and 30 g of fiber per day for men.  This will initially increase pain with bowel movements for at least the first week.  The fiber supplementation should continue to be taken for up to 8 weeks.  3.  We discussed that constipation and long periods of time sitting on the toilet can cause recurrent fissures.  4.  We discussed that a good anal exam is difficult due to the associated pain with anal fissures.  Therefore, if the pain is not completely resolved at 8 weeks, will need to undergo an exam under anesthesia to rule out other underlying  etiologies for the anal pain.  5.  She does have some moderate size anal skin tags.  This may make it more difficult for her to keep herself clean, but the anal skin tags are not the etiology of her pain or bleeding.  However, if she does require surgical intervention for her anal fissure/anal pain, can consider removal of the larger of the anal skin tags at that same time.  6.  Her anal exam was difficult due to her anal spasm, but there did appear to be grade 1 internal hemorrhoids as well.  Some of her bleeding could be from these hemorrhoids.  However, would not recommend use of the steroid suppositories given the high suspicion for an anal fissure.          Veronique Mckeon MD, FACS  [facility]  [address] [place] [address];   [address]  [place]  [address] [phone]

## 2024-12-31 DIAGNOSIS — F90.0 ATTENTION DEFICIT HYPERACTIVITY DISORDER (ADHD), PREDOMINANTLY INATTENTIVE TYPE: ICD-10-CM

## 2024-12-31 RX ORDER — DEXTROAMPHETAMINE SACCHARATE, AMPHETAMINE ASPARTATE MONOHYDRATE, DEXTROAMPHETAMINE SULFATE AND AMPHETAMINE SULFATE 7.5; 7.5; 7.5; 7.5 MG/1; MG/1; MG/1; MG/1
30 CAPSULE, EXTENDED RELEASE ORAL EVERY MORNING
Qty: 30 CAPSULE | Refills: 0 | Status: SHIPPED | OUTPATIENT
Start: 2024-12-31

## 2025-01-14 ENCOUNTER — APPOINTMENT (OUTPATIENT)
Facility: CLINIC | Age: 45
End: 2025-01-14
Payer: COMMERCIAL

## 2025-01-29 LAB
ALBUMIN SERPL-MCNC: 4.7 G/DL (ref 3.6–5.1)
ALP SERPL-CCNC: 62 U/L (ref 31–125)
ALT SERPL-CCNC: 10 U/L (ref 6–29)
ANION GAP SERPL CALCULATED.4IONS-SCNC: 8 MMOL/L (CALC) (ref 7–17)
AST SERPL-CCNC: 16 U/L (ref 10–35)
BASOPHILS # BLD AUTO: 58 CELLS/UL (ref 0–200)
BASOPHILS NFR BLD AUTO: 1.1 %
BILIRUB SERPL-MCNC: 1.7 MG/DL (ref 0.2–1.2)
BUN SERPL-MCNC: 10 MG/DL (ref 7–25)
CALCIUM SERPL-MCNC: 9.5 MG/DL (ref 8.6–10.2)
CHLORIDE SERPL-SCNC: 103 MMOL/L (ref 98–110)
CHOLEST SERPL-MCNC: 167 MG/DL
CHOLEST/HDLC SERPL: 2.6 (CALC)
CO2 SERPL-SCNC: 26 MMOL/L (ref 20–32)
CREAT SERPL-MCNC: 0.73 MG/DL (ref 0.5–0.99)
EGFRCR SERPLBLD CKD-EPI 2021: 103 ML/MIN/1.73M2
EOSINOPHIL # BLD AUTO: 69 CELLS/UL (ref 15–500)
EOSINOPHIL NFR BLD AUTO: 1.3 %
ERYTHROCYTE [DISTWIDTH] IN BLOOD BY AUTOMATED COUNT: 12.1 % (ref 11–15)
EST. AVERAGE GLUCOSE BLD GHB EST-MCNC: 103 MG/DL
EST. AVERAGE GLUCOSE BLD GHB EST-SCNC: 5.7 MMOL/L
GLUCOSE SERPL-MCNC: 101 MG/DL (ref 65–99)
HBA1C MFR BLD: 5.2 % OF TOTAL HGB
HCT VFR BLD AUTO: 43.4 % (ref 35–45)
HDLC SERPL-MCNC: 65 MG/DL
HGB BLD-MCNC: 14 G/DL (ref 11.7–15.5)
LDLC SERPL CALC-MCNC: 85 MG/DL (CALC)
LYMPHOCYTES # BLD AUTO: 1436 CELLS/UL (ref 850–3900)
LYMPHOCYTES NFR BLD AUTO: 27.1 %
MCH RBC QN AUTO: 31.1 PG (ref 27–33)
MCHC RBC AUTO-ENTMCNC: 32.3 G/DL (ref 32–36)
MCV RBC AUTO: 96.4 FL (ref 80–100)
MONOCYTES # BLD AUTO: 249 CELLS/UL (ref 200–950)
MONOCYTES NFR BLD AUTO: 4.7 %
NEUTROPHILS # BLD AUTO: 3487 CELLS/UL (ref 1500–7800)
NEUTROPHILS NFR BLD AUTO: 65.8 %
NONHDLC SERPL-MCNC: 102 MG/DL (CALC)
PLATELET # BLD AUTO: 253 THOUSAND/UL (ref 140–400)
PMV BLD REES-ECKER: 10.8 FL (ref 7.5–12.5)
POTASSIUM SERPL-SCNC: 4.1 MMOL/L (ref 3.5–5.3)
PROT SERPL-MCNC: 7.1 G/DL (ref 6.1–8.1)
RBC # BLD AUTO: 4.5 MILLION/UL (ref 3.8–5.1)
SODIUM SERPL-SCNC: 137 MMOL/L (ref 135–146)
TRIGL SERPL-MCNC: 79 MG/DL
WBC # BLD AUTO: 5.3 THOUSAND/UL (ref 3.8–10.8)

## 2025-01-30 ENCOUNTER — APPOINTMENT (OUTPATIENT)
Dept: PRIMARY CARE | Facility: CLINIC | Age: 45
End: 2025-01-30
Payer: COMMERCIAL

## 2025-01-30 VITALS
BODY MASS INDEX: 24.55 KG/M2 | WEIGHT: 162 LBS | HEART RATE: 78 BPM | SYSTOLIC BLOOD PRESSURE: 126 MMHG | HEIGHT: 68 IN | OXYGEN SATURATION: 98 % | DIASTOLIC BLOOD PRESSURE: 74 MMHG

## 2025-01-30 DIAGNOSIS — K60.2 ANAL FISSURE: ICD-10-CM

## 2025-01-30 DIAGNOSIS — K64.9 HEMORRHOIDS, UNSPECIFIED HEMORRHOID TYPE: ICD-10-CM

## 2025-01-30 DIAGNOSIS — F90.0 ATTENTION DEFICIT HYPERACTIVITY DISORDER (ADHD), PREDOMINANTLY INATTENTIVE TYPE: Primary | ICD-10-CM

## 2025-01-30 PROCEDURE — 1036F TOBACCO NON-USER: CPT | Performed by: INTERNAL MEDICINE

## 2025-01-30 PROCEDURE — 99214 OFFICE O/P EST MOD 30 MIN: CPT | Performed by: INTERNAL MEDICINE

## 2025-01-30 PROCEDURE — 3008F BODY MASS INDEX DOCD: CPT | Performed by: INTERNAL MEDICINE

## 2025-01-30 RX ORDER — DEXTROAMPHETAMINE SACCHARATE, AMPHETAMINE ASPARTATE MONOHYDRATE, DEXTROAMPHETAMINE SULFATE AND AMPHETAMINE SULFATE 7.5; 7.5; 7.5; 7.5 MG/1; MG/1; MG/1; MG/1
30 CAPSULE, EXTENDED RELEASE ORAL EVERY MORNING
Qty: 30 CAPSULE | Refills: 0 | Status: SHIPPED | OUTPATIENT
Start: 2025-01-30

## 2025-01-30 ASSESSMENT — ENCOUNTER SYMPTOMS
COUGH: 0
CONSTIPATION: 0
SHORTNESS OF BREATH: 0
EYE REDNESS: 0
HEADACHES: 0
WEAKNESS: 0
WHEEZING: 0
BACK PAIN: 0
BLOOD IN STOOL: 0
NUMBNESS: 0
PALPITATIONS: 0
DYSURIA: 0
ENDOCRINE NEGATIVE: 1
UNEXPECTED WEIGHT CHANGE: 0
AGITATION: 0
PSYCHIATRIC NEGATIVE: 1
FATIGUE: 0
FEVER: 0
JOINT SWELLING: 0
ACTIVITY CHANGE: 0
ARTHRALGIAS: 0
ALLERGIC/IMMUNOLOGIC NEGATIVE: 1
ADENOPATHY: 0
DIZZINESS: 0
FREQUENCY: 0
ABDOMINAL PAIN: 0

## 2025-01-30 NOTE — PROGRESS NOTES
"Subjective   Patient ID: Licha Ulrich is a 45 y.o. female who presents for Follow-up.     Patient presents with ADHD.   She is doing well, feels fine. She takes Adderall daily and seems to help with her job and activities. No problem with focus , concentration , mood swings , attention issues at this time and she eats and sleeps well.She is tolerating Adderall well.No urinary or bowel issues.No fever , cough , dyspnea , headache , dizziness , nausea , palpitations.She exercises regularly and she is back to her workplace.    She is taking care of  a three year old child which becomes stressful sometimes. She does vaping.         Review of Systems   Constitutional:  Negative for activity change, fatigue, fever and unexpected weight change.   HENT:  Negative for congestion.    Eyes:  Negative for redness and visual disturbance.        She wears contact lenses   Respiratory:  Negative for cough, shortness of breath and wheezing.    Cardiovascular:  Negative for chest pain, palpitations and leg swelling.   Gastrointestinal:  Negative for abdominal pain, blood in stool and constipation.   Endocrine: Negative.    Genitourinary:  Negative for dysuria, frequency and urgency.   Musculoskeletal:  Negative for arthralgias, back pain and joint swelling.   Skin:  Negative for rash.   Allergic/Immunologic: Negative.    Neurological:  Negative for dizziness, weakness, numbness and headaches.   Hematological:  Negative for adenopathy.   Psychiatric/Behavioral: Negative.  Negative for agitation and behavioral problems.        Objective   /74 (BP Location: Right arm, Patient Position: Sitting, BP Cuff Size: Adult)   Pulse 78   Ht 1.727 m (5' 8\")   Wt 73.5 kg (162 lb)   SpO2 98%   BMI 24.63 kg/m²     Physical Exam  Constitutional:       Appearance: Normal appearance.   Cardiovascular:      Rate and Rhythm: Normal rate and regular rhythm.      Pulses: Normal pulses.      Heart sounds: Normal heart sounds. No murmur " heard.  Pulmonary:      Effort: Pulmonary effort is normal.      Breath sounds: Normal breath sounds. No wheezing or rales.   Musculoskeletal:         General: Normal range of motion.   Skin:     General: Skin is warm and dry.      Capillary Refill: Capillary refill takes less than 2 seconds.      Findings: No rash.   Neurological:      General: No focal deficit present.      Mental Status: She is alert and oriented to person, place, and time.      Motor: No weakness.      Gait: Gait normal.   Psychiatric:         Mood and Affect: Mood normal.         Behavior: Behavior normal.         Assessment/Plan        ADHD , inattentive type:   refill Adderrall 30mg # 30   it has helped her to focus and concentrate better   OARRS reviewed   : I have personally reviewed the Oarrs  report on this patient.   I have considered the risks of abuse dependence addiction and diversion.             I believe it is clinically appropriate for this patient to be prescribed the medications     Hemorrhoids :  Anal skin tags , anal fissure  Anusol HC suppository  Nitrobid 2% Oint    Seen by Gen. Surgery , Veronique Mckeon MD    Vision check, she just had  She defers for  flushot, and covid booster   Gyn check , and mammogram per Dr Michael Hernandez  Labs reviewed , 1/28/25

## 2025-02-04 ENCOUNTER — APPOINTMENT (OUTPATIENT)
Facility: CLINIC | Age: 45
End: 2025-02-04
Payer: COMMERCIAL

## 2025-02-04 VITALS
HEIGHT: 68 IN | OXYGEN SATURATION: 97 % | DIASTOLIC BLOOD PRESSURE: 60 MMHG | HEART RATE: 77 BPM | SYSTOLIC BLOOD PRESSURE: 98 MMHG | BODY MASS INDEX: 25.01 KG/M2 | WEIGHT: 165 LBS

## 2025-02-04 DIAGNOSIS — K62.89 ANAL PAIN: ICD-10-CM

## 2025-02-04 DIAGNOSIS — K60.0 ACUTE ANAL FISSURE: Primary | ICD-10-CM

## 2025-02-04 DIAGNOSIS — K64.4 ANAL SKIN TAG: ICD-10-CM

## 2025-02-04 DIAGNOSIS — K64.8 INTERNAL HEMORRHOID, BLEEDING: ICD-10-CM

## 2025-02-04 PROCEDURE — 3008F BODY MASS INDEX DOCD: CPT | Performed by: SURGERY

## 2025-02-04 PROCEDURE — 99213 OFFICE O/P EST LOW 20 MIN: CPT | Performed by: SURGERY

## 2025-02-04 PROCEDURE — 1036F TOBACCO NON-USER: CPT | Performed by: SURGERY

## 2025-02-04 ASSESSMENT — ENCOUNTER SYMPTOMS
HEADACHES: 0
EYE PAIN: 0
VOMITING: 0
MYALGIAS: 0
WOUND: 0
FLANK PAIN: 0
POLYPHAGIA: 0
WEAKNESS: 0
FREQUENCY: 0
CONFUSION: 0
EYE REDNESS: 0
NAUSEA: 0
SHORTNESS OF BREATH: 0
AGITATION: 0
FEVER: 0
RECTAL PAIN: 1
CHILLS: 0
DYSURIA: 0
DIARRHEA: 0
BRUISES/BLEEDS EASILY: 0
HEMATURIA: 0
FATIGUE: 0
COUGH: 0
ABDOMINAL PAIN: 0
SPEECH DIFFICULTY: 0
ARTHRALGIAS: 0
CONSTIPATION: 0
ANAL BLEEDING: 1

## 2025-02-04 NOTE — PROGRESS NOTES
GENERAL SURGERY OFFICE NOTE    Patient: Licha Ulrich    Age: 45 y.o.   Gender: female    MRN: [id number]    PCP: Digna Sutton MD        SUBJECTIVE     Chief Complaint  Follow-up (Patient is here for a follow up anal skin tag. Patient states that the nitroglycerin ointment is working well.)       ANDREINA Hurt returns to the office for 4-week follow-up of her anal fissure.  She has been using the nitro sternal ointment 3 times a day.  She has had a mild headache, but nothing severe.  She is trying to eat a high-fiber diet, and now is taking some Metamucil daily as well.  Overall, she feels 90 to 95% improved.  Her pain is significantly reduced, and she is no longer having the burning sensation with bowel movements.  The amount of bleeding is almost nothing.  She is very pleased with her medical management of her fissure.  She does not feel that the anal skin tags are bothersome.    ROS  Review of Systems   Constitutional:  Negative for chills, fatigue and fever.   HENT:  Negative for congestion, ear pain and hearing loss.    Eyes:  Negative for pain and redness.   Respiratory:  Negative for cough and shortness of breath.    Cardiovascular:  Negative for chest pain and leg swelling.   Gastrointestinal:  Positive for anal bleeding and rectal pain. Negative for abdominal pain, constipation, diarrhea, nausea and vomiting.   Endocrine: Negative for polyphagia.   Genitourinary:  Negative for dysuria, flank pain, frequency and hematuria.   Musculoskeletal:  Negative for arthralgias and myalgias.   Skin:  Negative for rash and wound.   Allergic/Immunologic: Negative for immunocompromised state.   Neurological:  Negative for speech difficulty, weakness and headaches.   Hematological:  Does not bruise/bleed easily.   Psychiatric/Behavioral:  Negative for agitation and confusion.           HISTORY     Past Medical History:   Diagnosis Date    Abnormal cytological findings in specimens from other organs, systems and tissues  "09/18/2014    LGSIL (low grade squamous intraepithelial lesion) on Pap smear    ADHD (attention deficit hyperactivity disorder)     Fissure, anal 2021    Personal history of other mental and behavioral disorders     History of attention deficit disorder    Personal history of other mental and behavioral disorders 04/28/2014    History of attention deficit hyperactivity disorder    Rectal bleeding 2021        Past Surgical History:   Procedure Laterality Date    OTHER SURGICAL HISTORY  04/10/2014    Wrist Surgery Left        Family History   Problem Relation Name Age of Onset    Thyroid disease Mother      Hodgkin's lymphoma Father Jason Ulrich     Cancer Father Jason Ulrich     Diabetes type II Brother      Diabetes Brother Jason Ulrich II     Diabetes Brother Jason Ulrich II     Diabetes Brother Jason Ulrich II         No Known Allergies     Social History     Tobacco Use   Smoking Status Never   Smokeless Tobacco Never        Social History     Substance and Sexual Activity   Alcohol Use Yes    Alcohol/week: 2.0 standard drinks of alcohol    Types: 2 Glasses of wine per week        HOME MEDICATIONS  Current Outpatient Medications   Medication Instructions    amphetamine-dextroamphetamine XR (Adderall XR) 30 mg 24 hr capsule 30 mg, oral, Every morning    nitroglycerin (Ac-Bid) 2 % ointment 0.5 inches, transdermal, 3 times daily, Apply to outside anal area 3 times per day.          OBJECTIVE   Last Recorded Vitals.  Blood pressure 98/60, pulse 77, height 1.727 m (5' 8\"), weight 74.8 kg (165 lb), SpO2 97%.     PHYSICAL EXAM  Physical Exam   General: Well-developed, well-nourished and in no acute distress.  Head: Normocephalic. Atraumatic.  Neck/thyroid: Neck is supple.   Eyes: Pupils equal round and reactive to light. Conjunctiva normal.  ENMT: No masses or deformity of external nose. External ears without masses.  Respiratory/Chest:  Normal respiratory effort.  Cardiovascular: Regular rate and rhythm. "   Abdomen: Soft, nontender, nondistended.  Rectal: No evidence of a pilonidal cyst.  On inspection, she has 3 anal skin tags with 1 more prominent anal skin tag anteriorly.  All the skin tags are soft, nonindurated and nontender.  Significantly decreased anal spasm.  Able to do good rectal examination today.  Minimal induration along the anterior anal wall.  No palpable mass.  Grade 1 internal hemorrhoid of 1 column.  Musculoskeletal: Joints and limbs are grossly normal. Normal gait. Normal range of motion of major joints.  Neuro: Oriented to person, place and time. No obvious neurological deficit. Motor strength grossly normal.  Psych: Normal mood and affect.    RESULTS   LABS      RADIOLOGY RESULTS      PATHOLOGY      The above labs, radiology films and pathology reports were reviewed by myself.   Referring physician notes and other providers notes reviewed.      ASSESSMENT / PLAN   Diagnoses and all orders for this visit:  Acute anal fissure  Anal skin tag  Internal hemorrhoid, bleeding  Anal pain        Plan  1.  She seems to be responding quite well to the medical management.  She is about 90 to 95% improved.  She is encouraged to continue using the nitroglycerin ointment 2-3 times per day until she is completely pain-free, then she should use the ointment for 1 more week.  Should not use the ointment for more than 8 weeks total.  2.  High fiber diet: The patient was instructed to gradually increase dietary fiber intake to a goal of 25 g per day for women and 30 g of fiber per day for men.  This will initially increase pain with bowel movements for at least the first week.  The fiber supplementation should continue to be taken for up to 8 weeks.  3.  We discussed that constipation and long periods of time sitting on the toilet can cause recurrent fissures.  If she does have recurrent pain, she may start using the nitroglycerin ointment again.  However, if the pain is not improved within a few days, she was  encouraged to call the office for reevaluation.  4.  She does have some moderate size anal skin tags.  This may make it more difficult for her to keep herself clean, but the anal skin tags are not the etiology of her pain or bleeding.  She does not feel that the anal skin tags are bothersome at this point.  No intervention needed.  5.  She has responded well to medical management of her anal fissure.  She is referred back to her primary care physician for all further medical care, but may be referred back to my office for any further surgical needs.          Veronique Mckeon MD, FACS  [facility] General Surgery  [address] [place] [address];   [address]  [place]  [address] [phone]

## 2025-02-04 NOTE — PATIENT INSTRUCTIONS
1.  Continue using the nitroglycerin ointment 3 times a day until you are 100% pain-free.  Then, continue to use it for 1 more week.  However, do not use the nitroglycerin ointment for more than a of 8 weeks.  2.  Stay on a high-fiber diet to try to avoid constipation and a recurrent anal fissure.  3.  If you have any other questions or concerns regarding your anal pain or anal fissure, please call Dr. Mckeon's office.  [phone]

## 2025-02-11 ENCOUNTER — APPOINTMENT (OUTPATIENT)
Facility: CLINIC | Age: 45
End: 2025-02-11
Payer: COMMERCIAL

## 2025-02-28 DIAGNOSIS — F90.0 ATTENTION DEFICIT HYPERACTIVITY DISORDER (ADHD), PREDOMINANTLY INATTENTIVE TYPE: ICD-10-CM

## 2025-02-28 RX ORDER — DEXTROAMPHETAMINE SACCHARATE, AMPHETAMINE ASPARTATE MONOHYDRATE, DEXTROAMPHETAMINE SULFATE AND AMPHETAMINE SULFATE 7.5; 7.5; 7.5; 7.5 MG/1; MG/1; MG/1; MG/1
30 CAPSULE, EXTENDED RELEASE ORAL EVERY MORNING
Qty: 30 CAPSULE | Refills: 0 | Status: SHIPPED | OUTPATIENT
Start: 2025-02-28

## 2025-03-28 DIAGNOSIS — F90.0 ATTENTION DEFICIT HYPERACTIVITY DISORDER (ADHD), PREDOMINANTLY INATTENTIVE TYPE: ICD-10-CM

## 2025-03-28 RX ORDER — DEXTROAMPHETAMINE SACCHARATE, AMPHETAMINE ASPARTATE MONOHYDRATE, DEXTROAMPHETAMINE SULFATE AND AMPHETAMINE SULFATE 7.5; 7.5; 7.5; 7.5 MG/1; MG/1; MG/1; MG/1
30 CAPSULE, EXTENDED RELEASE ORAL EVERY MORNING
Qty: 30 CAPSULE | Refills: 0 | Status: SHIPPED | OUTPATIENT
Start: 2025-03-28

## 2025-04-30 ENCOUNTER — OFFICE VISIT (OUTPATIENT)
Dept: PRIMARY CARE | Facility: CLINIC | Age: 45
End: 2025-04-30
Payer: COMMERCIAL

## 2025-04-30 VITALS
BODY MASS INDEX: 25.16 KG/M2 | WEIGHT: 166 LBS | OXYGEN SATURATION: 98 % | HEIGHT: 68 IN | DIASTOLIC BLOOD PRESSURE: 70 MMHG | SYSTOLIC BLOOD PRESSURE: 122 MMHG | HEART RATE: 68 BPM

## 2025-04-30 DIAGNOSIS — K60.2 ANAL FISSURE: Primary | ICD-10-CM

## 2025-04-30 DIAGNOSIS — K64.9 HEMORRHOIDS, UNSPECIFIED HEMORRHOID TYPE: ICD-10-CM

## 2025-04-30 DIAGNOSIS — Z00.00 GENERAL MEDICAL EXAM: ICD-10-CM

## 2025-04-30 DIAGNOSIS — F90.0 ATTENTION DEFICIT HYPERACTIVITY DISORDER (ADHD), PREDOMINANTLY INATTENTIVE TYPE: ICD-10-CM

## 2025-04-30 PROCEDURE — 99396 PREV VISIT EST AGE 40-64: CPT | Performed by: INTERNAL MEDICINE

## 2025-04-30 PROCEDURE — 3008F BODY MASS INDEX DOCD: CPT | Performed by: INTERNAL MEDICINE

## 2025-04-30 PROCEDURE — 1036F TOBACCO NON-USER: CPT | Performed by: INTERNAL MEDICINE

## 2025-04-30 RX ORDER — DEXTROAMPHETAMINE SACCHARATE, AMPHETAMINE ASPARTATE MONOHYDRATE, DEXTROAMPHETAMINE SULFATE AND AMPHETAMINE SULFATE 7.5; 7.5; 7.5; 7.5 MG/1; MG/1; MG/1; MG/1
30 CAPSULE, EXTENDED RELEASE ORAL EVERY MORNING
Qty: 30 CAPSULE | Refills: 0 | Status: SHIPPED | OUTPATIENT
Start: 2025-04-30

## 2025-04-30 ASSESSMENT — ENCOUNTER SYMPTOMS
FEVER: 0
WEAKNESS: 0
BACK PAIN: 0
EYE REDNESS: 0
COUGH: 0
AGITATION: 0
BLOOD IN STOOL: 0
FATIGUE: 0
DIZZINESS: 0
CONSTIPATION: 0
ADENOPATHY: 0
HEADACHES: 0
ALLERGIC/IMMUNOLOGIC NEGATIVE: 1
ABDOMINAL PAIN: 0
PSYCHIATRIC NEGATIVE: 1
DYSURIA: 0
ACTIVITY CHANGE: 0
FREQUENCY: 0
SHORTNESS OF BREATH: 0
ARTHRALGIAS: 0
UNEXPECTED WEIGHT CHANGE: 0
ENDOCRINE NEGATIVE: 1
WHEEZING: 0
NUMBNESS: 0
PALPITATIONS: 0
JOINT SWELLING: 0

## 2025-04-30 NOTE — PROGRESS NOTES
Subjective   Patient ID: Licha Ulrich is a 45 y.o. female who presents for Follow-up.   And wellness exam  Patient presents with ADHD.   She is doing well, feels fine. She takes Adderall daily and seems to help with her job and activities. No problem with focus , concentration , mood swings , attention issues at this time and she eats and sleeps well.She is tolerating Adderall well.No urinary or bowel issues.No fever , cough , dyspnea , headache , dizziness , nausea , palpitations.She exercises regularly and she is back to her workplace.    She is taking care of  a three year old child which becomes stressful sometimes. She does vaping.  Anal fissure has resolved. No rectal pain, bleeding.     PMH:    Mammogram - yearly per Gyn  Pap  - yearly    Medical Problems:  Attention Deficit Hyperactivity Disorder  Surgical Hx:  Anal fissure    FH:  Father:  Cancer - lymphoma.  Mother:  Arthritis, Hypothyroidism.  Brother 1:  Diabetes - IRDM  type 1.    SH:  Marital: Single.Lives With: Alone.Pets: 1 dog.Occupation:  - .Sleep: Typically sleeps Less than 5 hours a night.Hand Dominance: Right-handed.  Personal Habits:  Cigarette Use: Never Smoked Cigarettes.Alcohol: Occasional Alcoholic Beverage.Drug Use: Denies Drug Use.Daily Caffeine: Consumes on average 1 cup of coffee per day, Consumes on average 5 sodas per day.Exercise Type: Exercises regularly.    she does Vape ,  alcohol rarely, no drug use    Review of Systems   Constitutional:  Negative for activity change, fatigue, fever and unexpected weight change.   HENT:  Negative for congestion.    Eyes:  Negative for redness and visual disturbance.        She wears contact lenses   Respiratory:  Negative for cough, shortness of breath and wheezing.    Cardiovascular:  Negative for chest pain, palpitations and leg swelling.   Gastrointestinal:  Negative for abdominal pain, blood in stool and constipation.   Endocrine: Negative.    Genitourinary:   "Negative for dysuria, frequency and urgency.   Musculoskeletal:  Negative for arthralgias, back pain and joint swelling.   Skin:  Negative for rash.   Allergic/Immunologic: Negative.    Neurological:  Negative for dizziness, weakness, numbness and headaches.   Hematological:  Negative for adenopathy.   Psychiatric/Behavioral: Negative.  Negative for agitation and behavioral problems.        Objective   /70 (BP Location: Right arm, Patient Position: Sitting, BP Cuff Size: Adult)   Pulse 68   Ht 1.727 m (5' 8\")   Wt 75.3 kg (166 lb)   SpO2 98%   BMI 25.24 kg/m²     Physical Exam  Constitutional:       Appearance: Normal appearance.   Cardiovascular:      Rate and Rhythm: Normal rate and regular rhythm.      Pulses: Normal pulses.      Heart sounds: Normal heart sounds. No murmur heard.  Pulmonary:      Effort: Pulmonary effort is normal.      Breath sounds: Normal breath sounds. No wheezing or rales.   Musculoskeletal:         General: Normal range of motion.   Skin:     General: Skin is warm and dry.      Capillary Refill: Capillary refill takes less than 2 seconds.      Findings: No rash.   Neurological:      General: No focal deficit present.      Mental Status: She is alert and oriented to person, place, and time.      Motor: No weakness.      Gait: Gait normal.   Psychiatric:         Mood and Affect: Mood normal.         Behavior: Behavior normal.         Assessment/Plan        ADHD , inattentive type:   refill Adderrall 30mg # 30   it has helped her to focus and concentrate better   OARRS reviewed  again,  1/30/24 signed  : I have personally reviewed the Oarrs  report on this patient.   I have considered the risks of abuse dependence addiction and diversion.             I believe it is clinically appropriate for this patient to be prescribed the medications     Hemorrhoids :  Anal skin tags , anal fissure  Anusol HC suppository  Nitrobid 2% Oint    Seen by Gen. Surgery , Veronique Mckeon , " MD    Vision check, she just had  She defers for  flushot, and covid booster   Gyn check , and mammogram per Dr Michael Hernandez  Labs reviewed , 1/28/25  Colonoscopy advised     .

## 2025-05-29 DIAGNOSIS — F90.0 ATTENTION DEFICIT HYPERACTIVITY DISORDER (ADHD), PREDOMINANTLY INATTENTIVE TYPE: ICD-10-CM

## 2025-05-29 RX ORDER — DEXTROAMPHETAMINE SACCHARATE, AMPHETAMINE ASPARTATE MONOHYDRATE, DEXTROAMPHETAMINE SULFATE AND AMPHETAMINE SULFATE 7.5; 7.5; 7.5; 7.5 MG/1; MG/1; MG/1; MG/1
30 CAPSULE, EXTENDED RELEASE ORAL EVERY MORNING
Qty: 30 CAPSULE | Refills: 0 | Status: SHIPPED | OUTPATIENT
Start: 2025-05-29

## 2025-06-25 DIAGNOSIS — Z12.31 ENCOUNTER FOR SCREENING MAMMOGRAM FOR BREAST CANCER: ICD-10-CM

## 2025-07-01 DIAGNOSIS — F90.0 ATTENTION DEFICIT HYPERACTIVITY DISORDER (ADHD), PREDOMINANTLY INATTENTIVE TYPE: ICD-10-CM

## 2025-07-01 RX ORDER — DEXTROAMPHETAMINE SACCHARATE, AMPHETAMINE ASPARTATE MONOHYDRATE, DEXTROAMPHETAMINE SULFATE AND AMPHETAMINE SULFATE 7.5; 7.5; 7.5; 7.5 MG/1; MG/1; MG/1; MG/1
30 CAPSULE, EXTENDED RELEASE ORAL EVERY MORNING
Qty: 30 CAPSULE | Refills: 0 | Status: SHIPPED | OUTPATIENT
Start: 2025-07-01

## 2025-07-30 ENCOUNTER — APPOINTMENT (OUTPATIENT)
Dept: PRIMARY CARE | Facility: CLINIC | Age: 45
End: 2025-07-30
Payer: COMMERCIAL

## 2025-07-30 VITALS
DIASTOLIC BLOOD PRESSURE: 70 MMHG | HEART RATE: 92 BPM | HEIGHT: 68 IN | WEIGHT: 164.8 LBS | OXYGEN SATURATION: 98 % | BODY MASS INDEX: 24.98 KG/M2 | SYSTOLIC BLOOD PRESSURE: 118 MMHG | TEMPERATURE: 97.6 F | RESPIRATION RATE: 18 BRPM

## 2025-07-30 DIAGNOSIS — F90.0 ATTENTION DEFICIT HYPERACTIVITY DISORDER (ADHD), PREDOMINANTLY INATTENTIVE TYPE: ICD-10-CM

## 2025-07-30 PROCEDURE — 99213 OFFICE O/P EST LOW 20 MIN: CPT | Performed by: INTERNAL MEDICINE

## 2025-07-30 PROCEDURE — 3008F BODY MASS INDEX DOCD: CPT | Performed by: INTERNAL MEDICINE

## 2025-07-30 PROCEDURE — 1036F TOBACCO NON-USER: CPT | Performed by: INTERNAL MEDICINE

## 2025-07-30 RX ORDER — DEXTROAMPHETAMINE SACCHARATE, AMPHETAMINE ASPARTATE MONOHYDRATE, DEXTROAMPHETAMINE SULFATE AND AMPHETAMINE SULFATE 7.5; 7.5; 7.5; 7.5 MG/1; MG/1; MG/1; MG/1
30 CAPSULE, EXTENDED RELEASE ORAL EVERY MORNING
Qty: 30 CAPSULE | Refills: 0 | Status: SHIPPED | OUTPATIENT
Start: 2025-07-30

## 2025-07-30 ASSESSMENT — ENCOUNTER SYMPTOMS
BACK PAIN: 0
DIZZINESS: 0
FATIGUE: 0
ENDOCRINE NEGATIVE: 1
BLOOD IN STOOL: 0
ACTIVITY CHANGE: 0
ABDOMINAL PAIN: 0
ADENOPATHY: 0
SHORTNESS OF BREATH: 0
JOINT SWELLING: 0
FREQUENCY: 0
WEAKNESS: 0
EYE REDNESS: 0
WHEEZING: 0
ARTHRALGIAS: 0
UNEXPECTED WEIGHT CHANGE: 0
NUMBNESS: 0
DYSURIA: 0
ALLERGIC/IMMUNOLOGIC NEGATIVE: 1
HEADACHES: 0
PSYCHIATRIC NEGATIVE: 1
FEVER: 0
COUGH: 0
CONSTIPATION: 0
PALPITATIONS: 0
AGITATION: 0

## 2025-07-30 ASSESSMENT — PATIENT HEALTH QUESTIONNAIRE - PHQ9
2. FEELING DOWN, DEPRESSED OR HOPELESS: NOT AT ALL
1. LITTLE INTEREST OR PLEASURE IN DOING THINGS: NOT AT ALL
SUM OF ALL RESPONSES TO PHQ9 QUESTIONS 1 AND 2: 0

## 2025-07-30 NOTE — PROGRESS NOTES
"Subjective   Patient ID: Lciha Ulrich is a 45 y.o. female who presents for Annual Exam (physical).   And wellness exam  Patient presents with ADHD.   She is doing well, feels fine. She takes Adderall daily and seems to help with her job and activities. No problem with focus , concentration , mood swings , attention issues at this time and she eats and sleeps well.She is tolerating Adderall well.No urinary or bowel issues.No fever , cough , dyspnea , headache , dizziness , nausea , palpitations.She exercises regularly and she is back to her workplace.    She is taking care of  a three year old child which becomes stressful sometimes. She does vaping.  Anal fissure has resolved. No rectal pain, bleeding.          Review of Systems   Constitutional:  Negative for activity change, fatigue, fever and unexpected weight change.   HENT:  Negative for congestion.    Eyes:  Negative for redness and visual disturbance.        She wears contact lenses   Respiratory:  Negative for cough, shortness of breath and wheezing.    Cardiovascular:  Negative for chest pain, palpitations and leg swelling.   Gastrointestinal:  Negative for abdominal pain, blood in stool and constipation.   Endocrine: Negative.    Genitourinary:  Negative for dysuria, frequency and urgency.   Musculoskeletal:  Negative for arthralgias, back pain and joint swelling.   Skin:  Negative for rash.   Allergic/Immunologic: Negative.    Neurological:  Negative for dizziness, weakness, numbness and headaches.   Hematological:  Negative for adenopathy.   Psychiatric/Behavioral: Negative.  Negative for agitation and behavioral problems.        Objective   /70 (BP Location: Left arm, Patient Position: Sitting, BP Cuff Size: Adult)   Pulse 92   Temp 36.4 °C (97.6 °F) (Temporal)   Resp 18   Ht 1.727 m (5' 8\")   Wt 74.8 kg (164 lb 12.8 oz)   SpO2 98%   BMI 25.06 kg/m²     Physical Exam  Constitutional:       Appearance: Normal appearance.     Cardiovascular: "      Rate and Rhythm: Normal rate and regular rhythm.      Pulses: Normal pulses.      Heart sounds: Normal heart sounds. No murmur heard.  Pulmonary:      Effort: Pulmonary effort is normal.      Breath sounds: Normal breath sounds. No wheezing or rales.     Musculoskeletal:         General: Normal range of motion.     Skin:     General: Skin is warm and dry.      Capillary Refill: Capillary refill takes less than 2 seconds.      Findings: No rash.     Neurological:      General: No focal deficit present.      Mental Status: She is alert and oriented to person, place, and time.     Psychiatric:         Mood and Affect: Mood normal.         Behavior: Behavior normal.         Assessment/Plan        ADHD , inattentive type:   refill Adderrall 30mg # 30   it has helped her to focus and concentrate better   [facility] reviewed  again,  1/30/24 signed  : I have personally reviewed the [facility]  report on this patient.   I have considered the risks of abuse dependence addiction and diversion.             I believe it is clinically appropriate for this patient to be prescribed the medications

## 2025-08-28 DIAGNOSIS — F90.0 ATTENTION DEFICIT HYPERACTIVITY DISORDER (ADHD), PREDOMINANTLY INATTENTIVE TYPE: ICD-10-CM

## 2025-08-28 RX ORDER — DEXTROAMPHETAMINE SACCHARATE, AMPHETAMINE ASPARTATE MONOHYDRATE, DEXTROAMPHETAMINE SULFATE AND AMPHETAMINE SULFATE 7.5; 7.5; 7.5; 7.5 MG/1; MG/1; MG/1; MG/1
30 CAPSULE, EXTENDED RELEASE ORAL EVERY MORNING
Qty: 30 CAPSULE | Refills: 0 | Status: SHIPPED | OUTPATIENT
Start: 2025-08-28